# Patient Record
Sex: MALE | Race: BLACK OR AFRICAN AMERICAN | Employment: FULL TIME | URBAN - METROPOLITAN AREA
[De-identification: names, ages, dates, MRNs, and addresses within clinical notes are randomized per-mention and may not be internally consistent; named-entity substitution may affect disease eponyms.]

---

## 2020-02-18 ENCOUNTER — DOCUMENTATION ONLY (OUTPATIENT)
Dept: SURGERY | Age: 65
End: 2020-02-18

## 2020-02-18 NOTE — PROGRESS NOTES
The patient's wife called to give us her cell phone number because Dr. Nirmal Rodriguez had asked for it. The wife, Davis Navas, states Dr. Marveen Homans requested \"information\" from her about her . Cell number [phone].  I let the patient's wife know that Dr. Marveen Homans was in the OR all day today and the call would not be returned until tomorrow at the earliest.